# Patient Record
Sex: MALE | Race: WHITE | NOT HISPANIC OR LATINO
[De-identification: names, ages, dates, MRNs, and addresses within clinical notes are randomized per-mention and may not be internally consistent; named-entity substitution may affect disease eponyms.]

---

## 2023-03-30 ENCOUNTER — APPOINTMENT (OUTPATIENT)
Dept: ORTHOPEDIC SURGERY | Facility: CLINIC | Age: 68
End: 2023-03-30
Payer: MEDICARE

## 2023-03-30 VITALS — BODY MASS INDEX: 29.12 KG/M2 | WEIGHT: 215 LBS | HEIGHT: 72 IN

## 2023-03-30 PROBLEM — Z00.00 ENCOUNTER FOR PREVENTIVE HEALTH EXAMINATION: Status: ACTIVE | Noted: 2023-03-30

## 2023-03-30 PROCEDURE — 20610 DRAIN/INJ JOINT/BURSA W/O US: CPT | Mod: LT

## 2023-03-30 PROCEDURE — 99203 OFFICE O/P NEW LOW 30 MIN: CPT | Mod: 25

## 2023-03-30 RX ORDER — AMLODIPINE BESYLATE AND BENAZEPRIL HYDROCHLORIDE 10; 20 MG/1; MG/1
10-20 CAPSULE ORAL
Refills: 0 | Status: ACTIVE | COMMUNITY

## 2023-03-30 RX ORDER — DICLOFENAC POTASSIUM 50 MG/1
50 POWDER, FOR SOLUTION ORAL
Refills: 0 | Status: ACTIVE | COMMUNITY

## 2023-03-30 NOTE — DISCUSSION/SUMMARY
[de-identified] : Reviewed MRI results with patient detail showing meniscal tear of left knee.  Discussed treatment options detail including rest, ice/heat, range of motion exercise, anti-inflammatory medication, physical therapy, cortisone injection, possible surgical intervention if needed.  Physical therapy prescription given.  Patient agreed to cortisone injection.\par \par Dexamethasone and Lidocaine  \par \par Anesthesia: ethyl chloride sprayed topically. Dexamethasone 10mg/51mL 2 cc.  \par \par Lidocaine 1%: 2 cc.  \par \par   \par \par Medication was injected in the left knee after verbal consent using sterile preparation and technique. The risks, benefits, and alternatives to cortisone injection were explained in full to the patient. Risks outlined include but are not limited to infection, sepsis, bleeding, scarring, skin discoloration, temporary increase in pain, syncopal episode, failure to resolve symptoms, allergic reaction, symptom recurrence, and elevation of blood sugar in diabetics. Patient understood the risks. All questions were answered. After discussion of options, patient requested an injection. Oral informed consent was obtained and sterile prep was done of the injection site. Sterile technique was utilized for the procedure including the preparation of the solutions used for the injection. Patient tolerated the procedure well. Advised to ice the injection site this evening. \par \par  \par Patient will follow-up in 6 to 8 weeks with sports department for further evaluation.  Seen under supervision of Dr. Mallory.

## 2023-03-30 NOTE — HISTORY OF PRESENT ILLNESS
[de-identified] : Patient is a 67-year-old male here for evaluation of left knee injury.  Patient states on 3/23/2023 he was getting up from a sitting position and twisted his left knee.  Patient was immediate pain.  Patient followed up with primary physician, MRI was ordered and patient was told he had a meniscal tear of the left knee.  Patient states over the last couple days he has been feeling slightly better but still has pain when standing for long periods of time, ambulating.  Patient has been taking diclofenac for pain.  Denies numbness/tingling, denies instability, denies buckling.

## 2023-03-30 NOTE — PHYSICAL EXAM
[Left] : left knee [Equivocal] : equivocal Cali [] : mildly antalgic [FreeTextEntry9] : Good range of motion throughout with mild pain to medial joint line [de-identified] : Good strength throughout

## 2023-05-04 ENCOUNTER — APPOINTMENT (OUTPATIENT)
Dept: ORTHOPEDIC SURGERY | Facility: CLINIC | Age: 68
End: 2023-05-04
Payer: MEDICARE

## 2023-05-04 PROCEDURE — 99213 OFFICE O/P EST LOW 20 MIN: CPT

## 2023-05-04 NOTE — HISTORY OF PRESENT ILLNESS
[de-identified] : Patient here for evaluation left knee pain.\par \par doing much better, no pain, had injection last visit\par \par NAD\par Left knee\par No skin breakdown\par no joint line ttp\par neg boogie\par Negative lachman\par Negative varus/valgus instability\par ROM 0-130\par Pain with forced extension and flexion\par NVI\par Compartments soft and NT\par \par Xray reviewed and significant for left knee mild degenerative changes\par \par mri left knee mmt, chondromalacia\par \par plan\par went over findings\par epxlained xray\par will cont cons tx since he is doing well and no pain\par hep, pain contorl\par if pain returns, fu in office

## 2023-07-15 ENCOUNTER — APPOINTMENT (OUTPATIENT)
Dept: ORTHOPEDIC SURGERY | Facility: CLINIC | Age: 68
End: 2023-07-15
Payer: MEDICARE

## 2023-07-15 VITALS — HEIGHT: 72 IN | WEIGHT: 215 LBS | BODY MASS INDEX: 29.12 KG/M2

## 2023-07-15 DIAGNOSIS — M23.304 OTHER MENISCUS DERANGEMENTS, UNSPECIFIED MEDIAL MENISCUS, LEFT KNEE: ICD-10-CM

## 2023-07-15 PROCEDURE — 20610 DRAIN/INJ JOINT/BURSA W/O US: CPT | Mod: LT

## 2023-07-15 PROCEDURE — 73562 X-RAY EXAM OF KNEE 3: CPT | Mod: LT

## 2023-07-15 NOTE — DISCUSSION/SUMMARY
[de-identified] : At this time we discussed different treatment options, he is going on vacation so he would like to try another cortisone injection today. Today under sterile technique the area was cleaned and prepped with alcohol, anesthetized with cold spray and prepped with betadine.  With the patient's consent, I injected 2.5 cc of dexamethasone and 2.5 cc of 1% lidocaine into the lateral joint space of the knee. The patient tolerated this well. The puncture site was cleaned and covered with a Band-Aid.  He is going to rest and ice the knee, anti-inflammatories as needed.  We will see him back in a few weeks for a follow-up with Dr. Baltazar to discuss further treatment options. Patient will call me if any other problems or concerns.  Patient verbalized understanding and agreed with the plan, all questions were answered in the office today.\par

## 2023-07-15 NOTE — HISTORY OF PRESENT ILLNESS
[de-identified] : 67-year-old male is here today for for pain in his left knee.  He was seen back in March and had a cortisone injection and was sent for an MRI.  He followed up in May with Dr. Baltazar, at that point he was no longer having any pain.  His MRI showed medial meniscus that was largely absent consistent with a diffuse tear with a parameniscal cyst.  At that point he was advised for no further treatment since she was not having any more pain.  He recently started developing pain again over the last week or so.  No new injury or trauma.

## 2023-07-15 NOTE — IMAGING
[de-identified] : On examination of his left knee he has mild swelling, no erythema, no ecchymosis.  No tenderness to the patella facets, negative patellar grind.  He is able to straight leg raise.  He has good range of motion of the knee.  Negative varus valgus stress test, negative anterior drawer.  He is mildly tender over the medial and lateral joint line.  Calf is soft and nontender.\par \par X-rays repeated in the office today show degenerative changes with joint space narrowing of the medial and patellofemoral compartments.  No fractures or other bony abnormalities noted.

## 2023-07-25 RX ORDER — NABUMETONE 500 MG/1
500 TABLET, FILM COATED ORAL
Qty: 60 | Refills: 0 | Status: ACTIVE | COMMUNITY
Start: 2023-07-25 | End: 1900-01-01

## 2023-08-04 ENCOUNTER — APPOINTMENT (OUTPATIENT)
Dept: ORTHOPEDIC SURGERY | Facility: CLINIC | Age: 68
End: 2023-08-04
Payer: MEDICARE

## 2023-08-04 PROCEDURE — 99214 OFFICE O/P EST MOD 30 MIN: CPT

## 2023-08-04 NOTE — HISTORY OF PRESENT ILLNESS
[de-identified] : Patient here for evaluation left knee pain.  Pain has returned and has had injections  NAD Left knee No skin breakdown no joint line ttp neg boogie Negative lachman Negative varus/valgus instability ROM 0-130 Pain with forced extension and flexion NVI Compartments soft and NT  Xray reviewed and significant for left knee mild degenerative changes  mri left knee mmt, chondromalacia  plan went over findings explained the mri op vs nonop explained left knee arthroscopy, medial meniscectomy  Operative and nonoperative options discussed with patient. Surgical risks, benefits, and alternatives explained. Surgical risks include but are not exclusive to bleeding, infection, neurovascular damage, continued pain, stiffness, scarring, rsd, dvt/pe, potential failure of surgery that may require further surgery in the future. I went over incisions and rehabilitation. All questions answered.

## 2023-08-07 RX ORDER — TRAMADOL HYDROCHLORIDE 50 MG/1
50 TABLET, COATED ORAL
Qty: 60 | Refills: 0 | Status: ACTIVE | COMMUNITY
Start: 2023-08-07 | End: 1900-01-01

## 2023-08-16 ENCOUNTER — INPATIENT (INPATIENT)
Facility: HOSPITAL | Age: 68
LOS: 0 days | Discharge: HOME | DRG: 310 | End: 2023-08-17
Attending: HOSPITALIST | Admitting: INTERNAL MEDICINE
Payer: MEDICARE

## 2023-08-16 VITALS
HEART RATE: 130 BPM | WEIGHT: 212.97 LBS | OXYGEN SATURATION: 100 % | RESPIRATION RATE: 18 BRPM | TEMPERATURE: 98 F | DIASTOLIC BLOOD PRESSURE: 99 MMHG | SYSTOLIC BLOOD PRESSURE: 173 MMHG

## 2023-08-16 DIAGNOSIS — Z98.890 OTHER SPECIFIED POSTPROCEDURAL STATES: Chronic | ICD-10-CM

## 2023-08-16 DIAGNOSIS — I48.91 UNSPECIFIED ATRIAL FIBRILLATION: ICD-10-CM

## 2023-08-16 LAB
ALBUMIN SERPL ELPH-MCNC: 5 G/DL — SIGNIFICANT CHANGE UP (ref 3.5–5.2)
ALP SERPL-CCNC: 50 U/L — SIGNIFICANT CHANGE UP (ref 30–115)
ALT FLD-CCNC: 27 U/L — SIGNIFICANT CHANGE UP (ref 0–41)
ANION GAP SERPL CALC-SCNC: 14 MMOL/L — SIGNIFICANT CHANGE UP (ref 7–14)
APTT BLD: 31.1 SEC — SIGNIFICANT CHANGE UP (ref 27–39.2)
AST SERPL-CCNC: 18 U/L — SIGNIFICANT CHANGE UP (ref 0–41)
BASOPHILS # BLD AUTO: 0.06 K/UL — SIGNIFICANT CHANGE UP (ref 0–0.2)
BASOPHILS NFR BLD AUTO: 0.6 % — SIGNIFICANT CHANGE UP (ref 0–1)
BILIRUB SERPL-MCNC: 1.6 MG/DL — HIGH (ref 0.2–1.2)
BUN SERPL-MCNC: 20 MG/DL — SIGNIFICANT CHANGE UP (ref 10–20)
CALCIUM SERPL-MCNC: 10.3 MG/DL — SIGNIFICANT CHANGE UP (ref 8.4–10.4)
CHLORIDE SERPL-SCNC: 95 MMOL/L — LOW (ref 98–110)
CO2 SERPL-SCNC: 32 MMOL/L — SIGNIFICANT CHANGE UP (ref 17–32)
CREAT SERPL-MCNC: 0.9 MG/DL — SIGNIFICANT CHANGE UP (ref 0.7–1.5)
EGFR: 93 ML/MIN/1.73M2 — SIGNIFICANT CHANGE UP
EOSINOPHIL # BLD AUTO: 0.1 K/UL — SIGNIFICANT CHANGE UP (ref 0–0.7)
EOSINOPHIL NFR BLD AUTO: 1 % — SIGNIFICANT CHANGE UP (ref 0–8)
GLUCOSE SERPL-MCNC: 133 MG/DL — HIGH (ref 70–99)
HCT VFR BLD CALC: 49.4 % — SIGNIFICANT CHANGE UP (ref 42–52)
HGB BLD-MCNC: 17.7 G/DL — SIGNIFICANT CHANGE UP (ref 14–18)
IMM GRANULOCYTES NFR BLD AUTO: 0.4 % — HIGH (ref 0.1–0.3)
INR BLD: 1.25 RATIO — SIGNIFICANT CHANGE UP (ref 0.65–1.3)
LYMPHOCYTES # BLD AUTO: 1.33 K/UL — SIGNIFICANT CHANGE UP (ref 1.2–3.4)
LYMPHOCYTES # BLD AUTO: 12.7 % — LOW (ref 20.5–51.1)
MCHC RBC-ENTMCNC: 30 PG — SIGNIFICANT CHANGE UP (ref 27–31)
MCHC RBC-ENTMCNC: 35.8 G/DL — SIGNIFICANT CHANGE UP (ref 32–37)
MCV RBC AUTO: 83.7 FL — SIGNIFICANT CHANGE UP (ref 80–94)
MONOCYTES # BLD AUTO: 0.77 K/UL — HIGH (ref 0.1–0.6)
MONOCYTES NFR BLD AUTO: 7.4 % — SIGNIFICANT CHANGE UP (ref 1.7–9.3)
NEUTROPHILS # BLD AUTO: 8.17 K/UL — HIGH (ref 1.4–6.5)
NEUTROPHILS NFR BLD AUTO: 77.9 % — HIGH (ref 42.2–75.2)
NRBC # BLD: 0 /100 WBCS — SIGNIFICANT CHANGE UP (ref 0–0)
NT-PROBNP SERPL-SCNC: 29 PG/ML — SIGNIFICANT CHANGE UP (ref 0–300)
PLATELET # BLD AUTO: 364 K/UL — SIGNIFICANT CHANGE UP (ref 130–400)
PMV BLD: 9.1 FL — SIGNIFICANT CHANGE UP (ref 7.4–10.4)
POTASSIUM SERPL-MCNC: 3.7 MMOL/L — SIGNIFICANT CHANGE UP (ref 3.5–5)
POTASSIUM SERPL-SCNC: 3.7 MMOL/L — SIGNIFICANT CHANGE UP (ref 3.5–5)
PROT SERPL-MCNC: 7.6 G/DL — SIGNIFICANT CHANGE UP (ref 6–8)
PROTHROM AB SERPL-ACNC: 14.4 SEC — HIGH (ref 9.95–12.87)
RBC # BLD: 5.9 M/UL — SIGNIFICANT CHANGE UP (ref 4.7–6.1)
RBC # FLD: 12.8 % — SIGNIFICANT CHANGE UP (ref 11.5–14.5)
SODIUM SERPL-SCNC: 141 MMOL/L — SIGNIFICANT CHANGE UP (ref 135–146)
TROPONIN T SERPL-MCNC: <0.01 NG/ML — SIGNIFICANT CHANGE UP
WBC # BLD: 10.47 K/UL — SIGNIFICANT CHANGE UP (ref 4.8–10.8)
WBC # FLD AUTO: 10.47 K/UL — SIGNIFICANT CHANGE UP (ref 4.8–10.8)

## 2023-08-16 PROCEDURE — 83036 HEMOGLOBIN GLYCOSYLATED A1C: CPT

## 2023-08-16 PROCEDURE — 93312 ECHO TRANSESOPHAGEAL: CPT

## 2023-08-16 PROCEDURE — 86803 HEPATITIS C AB TEST: CPT

## 2023-08-16 PROCEDURE — 92960 CARDIOVERSION ELECTRIC EXT: CPT

## 2023-08-16 PROCEDURE — 99221 1ST HOSP IP/OBS SF/LOW 40: CPT

## 2023-08-16 PROCEDURE — 93320 DOPPLER ECHO COMPLETE: CPT

## 2023-08-16 PROCEDURE — 93010 ELECTROCARDIOGRAM REPORT: CPT

## 2023-08-16 PROCEDURE — 71045 X-RAY EXAM CHEST 1 VIEW: CPT | Mod: 26

## 2023-08-16 PROCEDURE — 99285 EMERGENCY DEPT VISIT HI MDM: CPT | Mod: FS

## 2023-08-16 PROCEDURE — 80053 COMPREHEN METABOLIC PANEL: CPT

## 2023-08-16 PROCEDURE — 85025 COMPLETE CBC W/AUTO DIFF WBC: CPT

## 2023-08-16 PROCEDURE — 83735 ASSAY OF MAGNESIUM: CPT

## 2023-08-16 PROCEDURE — 84443 ASSAY THYROID STIM HORMONE: CPT

## 2023-08-16 PROCEDURE — 93325 DOPPLER ECHO COLOR FLOW MAPG: CPT

## 2023-08-16 PROCEDURE — 36415 COLL VENOUS BLD VENIPUNCTURE: CPT

## 2023-08-16 RX ORDER — LANOLIN ALCOHOL/MO/W.PET/CERES
3 CREAM (GRAM) TOPICAL AT BEDTIME
Refills: 0 | Status: DISCONTINUED | OUTPATIENT
Start: 2023-08-16 | End: 2023-08-17

## 2023-08-16 RX ORDER — AMLODIPINE BESYLATE 2.5 MG/1
10 TABLET ORAL DAILY
Refills: 0 | Status: DISCONTINUED | OUTPATIENT
Start: 2023-08-16 | End: 2023-08-17

## 2023-08-16 RX ORDER — RIVAROXABAN 15 MG-20MG
20 KIT ORAL
Refills: 0 | Status: DISCONTINUED | OUTPATIENT
Start: 2023-08-16 | End: 2023-08-17

## 2023-08-16 RX ORDER — ACETAMINOPHEN 500 MG
650 TABLET ORAL EVERY 6 HOURS
Refills: 0 | Status: DISCONTINUED | OUTPATIENT
Start: 2023-08-16 | End: 2023-08-16

## 2023-08-16 RX ORDER — ONDANSETRON 8 MG/1
4 TABLET, FILM COATED ORAL EVERY 8 HOURS
Refills: 0 | Status: DISCONTINUED | OUTPATIENT
Start: 2023-08-16 | End: 2023-08-16

## 2023-08-16 RX ORDER — DILTIAZEM HCL 120 MG
180 CAPSULE, EXT RELEASE 24 HR ORAL DAILY
Refills: 0 | Status: DISCONTINUED | OUTPATIENT
Start: 2023-08-16 | End: 2023-08-17

## 2023-08-16 RX ORDER — SODIUM CHLORIDE 9 MG/ML
1000 INJECTION INTRAMUSCULAR; INTRAVENOUS; SUBCUTANEOUS ONCE
Refills: 0 | Status: COMPLETED | OUTPATIENT
Start: 2023-08-16 | End: 2023-08-16

## 2023-08-16 RX ORDER — METOPROLOL TARTRATE 50 MG
12.5 TABLET ORAL EVERY 12 HOURS
Refills: 0 | Status: DISCONTINUED | OUTPATIENT
Start: 2023-08-16 | End: 2023-08-16

## 2023-08-16 RX ORDER — LANOLIN ALCOHOL/MO/W.PET/CERES
5 CREAM (GRAM) TOPICAL AT BEDTIME
Refills: 0 | Status: DISCONTINUED | OUTPATIENT
Start: 2023-08-16 | End: 2023-08-17

## 2023-08-16 RX ADMIN — AMLODIPINE BESYLATE 10 MILLIGRAM(S): 2.5 TABLET ORAL at 18:25

## 2023-08-16 RX ADMIN — RIVAROXABAN 20 MILLIGRAM(S): KIT at 15:03

## 2023-08-16 RX ADMIN — Medication 180 MILLIGRAM(S): at 18:25

## 2023-08-16 RX ADMIN — Medication 5 MILLIGRAM(S): at 22:45

## 2023-08-16 RX ADMIN — SODIUM CHLORIDE 1000 MILLILITER(S): 9 INJECTION INTRAMUSCULAR; INTRAVENOUS; SUBCUTANEOUS at 13:40

## 2023-08-16 NOTE — ED PROVIDER NOTE - CLINICAL SUMMARY MEDICAL DECISION MAKING FREE TEXT BOX
67y history of  HTN sent to ED due to new onset a-fib rvr. pt  have PST for knee replacement today and was found to be in Afib, pt states last time approxmalyte 2-3 days ago had a normal ecg.. Pt endorses intermittent episodes of painless palpitations over the past couple days. No inciting or relieving factors. Denies fever, ha, cp, sob, weakness, numbness, n/v  phyical exam other than irregular tachycardia was unremarkable  labs wnl.  pt will be admitted under telemetric bed

## 2023-08-16 NOTE — CONSULT NOTE ADULT - SUBJECTIVE AND OBJECTIVE BOX
Date of Admission: 8/16/23    CHIEF COMPLAINT: found in afib while about to go for knee scope    HISTORY OF PRESENT ILLNESS: 67yMale with PMH below presented to the hospital for above CC. PMHx of borderline HTN, torn L meniscus. Likely has sleep apnea by history of snoring, night awakenings. This is his likely cause.   Denies symptoms of palpitations or shortness of breath so unknown duration of onset, although had EKG a fwew days ago which was WNL per patient in Dr. Fischer's office.     PAST MEDICAL & SURGICAL HISTORY:      FAMILY HISTORY:  [x ] no pertinent family history of premature cardiovascular disease in first degree relatives.  Mother:   Father:   Siblings:     SOCIAL HISTORY:    [ ] Non-smoker  [ ] Smoker  [ ] Alcohol    Allergies    No Known Allergies    Intolerances    	    REVIEW OF SYSTEMS:  CONSTITUTIONAL: No fever, weight loss, or fatigue  CARDIOLOGY: PAtient denies chest pain, shortness of breath or syncopal episodes.   RESPIRATORY: denies shortness of breath, wheezeing.   NEUROLOGICAL: NO weakness, no focal deficits to report.  ENDOCRINOLOGICAL: no recent change in diabetic medications.   GI: no BRBPR, no N,V,diarrhea.    PSYCHIATRY: normal mood and affect  HEENT: no nasal discharge, no ecchymosis  SKIN: no ecchymosis, no breakdown  MUSCULOSKELETAL: Full range of motion x4.     PHYSICAL EXAM:  T(C): 36.8 (08-16-23 @ 12:57), Max: 36.8 (08-16-23 @ 12:57)  HR: 130 (08-16-23 @ 12:57) (130 - 130)  BP: 173/99 (08-16-23 @ 12:57) (173/99 - 173/99)  RR: 18 (08-16-23 @ 12:57) (18 - 18)  SpO2: 100% (08-16-23 @ 12:57) (100% - 100%)  Wt(kg): --  I&O's Summary      General Appearance: well appearing, normal for age and gender. 	  Neck: normal JVP, no bruit.   Eyes: No xanthomalasia, Extra Ocular muscles intact.   Cardiovascular: irregular rate and rhythm S1 S2, No JVD, No murmurs, No edema  Respiratory: Lungs clear to auscultation	  Psychiatry: Alert and oriented x 3, Mood & affect appropriate  Gastrointestinal:  Soft, Non-tender  Skin/Integumen: No rashes, No ecchymoses, No cyanosis	  Neurologic: Non-focal  Musculoskeletal/ extremities: Normal range of motion, No clubbing, cyanosis or edema  Vascular: Peripheral pulses palpable 2+ bilaterally    LABS:	 	                          17.7   10.47 )-----------( 364      ( 16 Aug 2023 13:28 )             49.4     08-16    141  |  95<L>  |  20  ----------------------------<  133<H>  3.7   |  32  |  0.9    Ca    10.3      16 Aug 2023 13:28    TPro  7.6  /  Alb  5.0  /  TBili  1.6<H>  /  DBili  x   /  AST  18  /  ALT  27  /  AlkPhos  50  08-16    CARDIAC MARKERS ( 16 Aug 2023 13:28 )  x     / <0.01 ng/mL / x     / x     / x          PT/INR - ( 16 Aug 2023 13:28 )   PT: 14.40 sec;   INR: 1.25 ratio         PTT - ( 16 Aug 2023 13:28 )  PTT:31.1 sec    CARDIAC MARKERS:            TELEMETRY EVENTS: 	afib    ECG:  Afib	  RADIOLOGY:  OTHER: 	    PREVIOUS DIAGNOSTIC TESTING:    [ ] Echocardiogram:  [ ]  Catheterization:  [ ] Stress Test:  	  	    Home Medications:    MEDICATIONS  (STANDING):    MEDICATIONS  (PRN):

## 2023-08-16 NOTE — ED PROVIDER NOTE - ATTENDING APP SHARED VISIT CONTRIBUTION OF CARE
I have personally performed a history and physical exam on this patient and personally directed the management of the patient.  67y history of  HTN sent to ED due to new onset a-fib rvr. pt  have PST for knee replacement today and was found to be in Afib, pt states last time approxmalyte 2-3 days ago had a normal ecg.. Pt endorses intermittent episodes of painless palpitations over the past couple days. No inciting or relieving factors. Denies fever, ha, cp, sob, weakness, numbness, n/v  CON: appears stated age, pleasant, no acute distress, HENMT: normocephalic, atraumatic, anicteric, no conjunctival injection,  CV: irrigular rhythm, tachycardic to 120s distal pulses intact, RESP: no acute respiratory distress, no stridor, breathing comfortably on RA , GI:  soft, nontender, no rebound, no guarding, SKIN: no wounds MSK: no deformities, NEURO: no gross motor or sensory deficit Psychiatric: appropriate mood, appropriate affect  will send labs, cardiology consult and likely admit

## 2023-08-16 NOTE — PACU DISCHARGE NOTE - THE ANESTHESIA ORDERS USED IN THE PACU ORDER SET WILL BE DISCONTINUED UPON TRANSFER OF THIS PATIENT
Statement Selected Lazy S Intermediate Repair Preamble Text (Leave Blank If You Do Not Want): Undermining was performed with blunt dissection.

## 2023-08-16 NOTE — H&P ADULT - NSHPPHYSICALEXAM_GEN_ALL_CORE
LOS:     VITALS:   T(C): 36.8 (08-16-23 @ 15:16), Max: 36.8 (08-16-23 @ 12:57)  HR: 130 (08-16-23 @ 15:16) (130 - 130)  BP: 173/99 (08-16-23 @ 15:16) (173/99 - 173/99)  RR: 18 (08-16-23 @ 15:16) (18 - 18)  SpO2: 100% (08-16-23 @ 15:16) (100% - 100%)    GENERAL: NAD, lying in bed comfortably  HEAD:  Atraumatic, Normocephalic  EYES: EOMI, PERRLA, conjunctiva and sclera clear  ENT: Moist mucous membranes  NECK: Supple, No JVD  CHEST/LUNG: Clear to auscultation bilaterally; No rales, rhonchi, wheezing, or rubs. Unlabored respirations  HEART: Regular rate and rhythm; No murmurs, rubs, or gallops  ABDOMEN: BSx4; Soft, nontender, nondistended  EXTREMITIES:  2+ Peripheral Pulses, brisk capillary refill. No clubbing, cyanosis, or edema  NERVOUS SYSTEM:  A&Ox3, no focal deficits   SKIN: No rashes or lesions LOS:     VITALS:   T(C): 36.8 (08-16-23 @ 15:16), Max: 36.8 (08-16-23 @ 12:57)  HR: 130 (08-16-23 @ 15:16) (130 - 130)  BP: 173/99 (08-16-23 @ 15:16) (173/99 - 173/99)  RR: 18 (08-16-23 @ 15:16) (18 - 18)  SpO2: 100% (08-16-23 @ 15:16) (100% - 100%)    GENERAL: NAD, lying in bed comfortably  HEAD:  NCAT  EYES: Conjunctiva and sclera clear  ENT: Moist mucous membranes  NECK: Supple, No JVD  CHEST/LUNG: + Bibasilar crackles; Unlabored respirations  HEART: RRR  ABDOMEN: BS present; + Mildly distended; Soft, nontender  EXTREMITIES: No clubbing, cyanosis, or edema  NERVOUS SYSTEM:  A&Ox4, no focal deficits

## 2023-08-16 NOTE — H&P ADULT - HISTORY OF PRESENT ILLNESS
66 y/o M w/ PMHx of HTN presented to the ED for AFib RVR found incidentally on EKG done for pre-testing prior to L Knee meniscal tear repair. Unknown onset, though patient states he had an EKG a few days, which was WNL per patient in Dr. Fischer's office.  68 y/o M w/ PMHx of HTN presented to the ED for AFib RVR found incidentally on EKG done for pre-testing prior to L Knee meniscal tear repair. Unknown onset, though patient states he had an EKG a few days, which was WNL per patient in Dr. Fischer's office. No cardiology w/u in the past. Denies: CP, SOB, dizziness/lightheadedness, hx of prior sxs.     In the ED, VS significant for elevated BP w/ BP of 173/99, tachycardic w/ HR of 130; other VS unremarkable. EKG showed new onset AFib w/ RVR. Labs significant for Tbili 1.6, otherwise unremarkable. S/p 1L bolus in the ED. Cardiology consulted & recommended SULEIMAN/dCVV, which was performed successfully on 8/16/23. Recommended admission to telemetry for monitoring for new onset AFib w/ RVR s/p successful dCVV

## 2023-08-16 NOTE — H&P ADULT - NSICDXPASTSURGICALHX_GEN_ALL_CORE_FT
PAST SURGICAL HISTORY:  History of excision of pilonidal cyst     History of surgical removal of testicle

## 2023-08-16 NOTE — ED ADULT TRIAGE NOTE - CHIEF COMPLAINT QUOTE
Patient states he was sent to ED from presurgical testing due to afib with RVR. Patient states he was scheduled for knee surgery today,

## 2023-08-16 NOTE — H&P ADULT - ASSESSMENT
66 y/o M w/ PMHx of HTN presented to the ED for AFib RVR found incidentally on EKG done for pre-testing prior to L Knee meniscal tear repair. Unknown onset, though patient states he had an EKG a few days, which was WNL per patient in Dr. Fischer's office.      66 y/o M w/ PMHx of HTN presented to the ED for AFib RVR. S/p sucessful SULEIMAN/dCVV. Admitted to tele for monitoring    #New onset AFib  - S/p SULEIMAN/dCVV on 8/16  - CHADSVASC 2  - Cardio on board:  Rate control w/ cardizem  mg po q24h  Xarelto 20 mg po q24h for at least one month, uninterrupted after DCCV  Patient understands he will have to delay meniscus surgery for one month after DCCV.   - F/u TSH, A1c  - F/u SULEIMAN results  - OP sleep study for likely sleep apnea; endorses snoring    #HTN  - /99 on admission  - C/w home amlodipine 10 mg daily  - C/w home chlorthalidone 25 mg daily    #MISC  - DVT ppx: Xarelto  - GI ppx: NI  - Diet: DASH  - Activity: AAT  - Code status: Full Code  - Dispo: Tele; anticipate d/c in less than 24 hours

## 2023-08-16 NOTE — ED ADULT NURSE NOTE - OBJECTIVE STATEMENT
Pt. states he was sent to ED from pre-surgery testing. As per pt.. pt. was supposed to go for knee surgery, but was sent her to due to A-fib with RVR. Pt. denies chest pain/SOB. Pt. denies N/V/D.

## 2023-08-16 NOTE — CHART NOTE - NSCHARTNOTEFT_GEN_A_CORE
POST OPERATIVE PROCEDURAL DOCUMENTATION  PRE-OP DIAGNOSIS: A fib    POST-OP DIAGNOSIS: Sinus rhythm with PAC    PROCEDURE: Transesophageal Echocardiogram and DCCV    Primary Physician: Dr. Rahman   Cardiology Fellow: Dr. Norton    ANESTHESIA TYPE  [  ] General Anesthesia  [ x ] Conscious Sedation  [  ] Local/Regional    CONDITION  [  ] Critical  [  ] Serious  [  ] Fair  [ x ] Good    SPECIMENS REMOVED (IF APPLICABLE): N/A    IMPLANTS (IF APPLICABLE): None    ESTIMATED BLOOD LOSS: None    COMPLICATIONS: None    After risks and benefits of procedures were explained, informed consent was obtained and placed in chart.   The patient received topical anesthetic to the oropharynx with viscous lidocaine and benzocaine spray.  Refer to Anesthesia note for sedation details.  The SULEIMAN probe was passed into the esophagus without difficulty.  Transesophageal and transgastric images were obtained.  The SULEIMAN probe was removed without difficulty and examined.  There was no evidence for bleeding.  The patient tolerated the procedure well without any immediate SULEIMAN-related complications.      Preliminary Findings:  LA: Mildly enlarged  DONNA: Left atrial appendage was clear of clot and smoke. Normal doppler velocities   LV: LVEF was estimated at 55-65%  MV: Mild MR  AV: No AI, no  AS  RA: Mildly enlarged  RV: Normal size and function  TV: Trivial TR  PV: No AL, no PS  IAS: No PFO or ASD. No R-> L shunt   Aorta: There was mild, non-mobile atheroma seen in the thoracic aorta.    Patient successfully converted to sinus rhythm with 200 J of synchronized direct current cardioversion (DCCV) via AP pads.    DIAGNOSIS/IMPRESSION: s/p DCCV (200 J) now in sinus rhythm with PAC    Full report to follow    PLAN OF CARE:  [x] Return to inpatient bed when stable and fully awake.  [x] Continue xarelto  [x] No eating or drinking for 1 hour  [X] Start Cardizem 180 mg PO QD  [x] No driving for 24 hours    Results of procedure/ plan of care discussed with patient/  in detail.

## 2023-08-16 NOTE — CONSULT NOTE ADULT - ASSESSMENT
New onset Atrial Fibrillation  - rate control with cardizem  mg po q24h  - CHADSVASC 2  - Xarelto 20 mg po q24h for at least one month, uninterrupted after DCCV  - keep NPO, will perform SULEIMAN/DCCV today  - check tsh  - evaluation for obstructive sleep apnea as outpatient  - anticipate discharge tomorrow.   - Patient understand he will have to delay meniscus surgery for one month after DCCV.

## 2023-08-16 NOTE — ED ADULT NURSE REASSESSMENT NOTE - NS ED NURSE REASSESS COMMENT FT1
Report given by CATH RN. As per RN, pt. received 1 shock; 200 joules. No clots. 5mg Metoprolol IV push administered in CATH. 20mg Xarelto PO administered in CATH. Pt. remains in CATH currently.

## 2023-08-16 NOTE — H&P ADULT - ATTENDING COMMENTS
68 y/o M w/ PMHx of HTN presented to the ED for AFib RVR. S/p sucessful SULEIMAN/dCVV. Admitted to tele for monitoring    #Paroxysmal AFib  S/p SULEIMAN/dCVV on 8/16  CHADSVAS 2  Cardio on board:  - Cont cardizem  mg po q24h  - Cont Xarelto 20 mg po q24h for at least one month, uninterrupted after DCCV  - F/u TSH, A1c  - OP sleep study for likely sleep apnea; endorses snoring  - DC planning tomorrow if stable    #HTN  - /99 on admission  - C/w home amlodipine 10 mg daily  - C/w home chlorthalidone 25 mg daily    DVT PPX, xarelto    #Progress Note Handoff  Pending (specify): Tele monitoring  Family discussion: annie pt regarding tx for afib  Disposition: Home

## 2023-08-16 NOTE — ED ADULT NURSE NOTE - NSFALLUNIVINTERV_ED_ALL_ED
Bed/Stretcher in lowest position, wheels locked, appropriate side rails in place/Call bell, personal items and telephone in reach/Instruct patient to call for assistance before getting out of bed/chair/stretcher/Non-slip footwear applied when patient is off stretcher/[place] to call system/Physically safe environment - no spills, clutter or unnecessary equipment/Purposeful proactive rounding/Room/bathroom lighting operational, light cord in reach

## 2023-08-16 NOTE — ED PROVIDER NOTE - OBJECTIVE STATEMENT
67y M pmh HTN presents for eval. Pt went to have PST for knee replacement today and was found to be in Afib. Pt endorses intermittent episodes of painless palpitations over the past couple days. No inciting or relieving factors. Denies fever, ha, cp, sob, weakness, numbness, n/v

## 2023-08-16 NOTE — ED PROVIDER NOTE - PHYSICAL EXAMINATION
CONST: Well appearing in NAD  CARD: Irregular tachycardiac S1 S2; No jvd  RESP: Equal BS B/L, No wheezes, rhonchi or rales. No distress  MS: Normal ROM in all extremities. pulses 2 +. no calf tenderness or swelling  SKIN: Warm, dry, no acute rashes. Good turgor  NEURO: A&Ox3, No focal deficits. Strength 5/5 with no sensory deficits.

## 2023-08-17 ENCOUNTER — TRANSCRIPTION ENCOUNTER (OUTPATIENT)
Age: 68
End: 2023-08-17

## 2023-08-17 VITALS
DIASTOLIC BLOOD PRESSURE: 79 MMHG | TEMPERATURE: 98 F | OXYGEN SATURATION: 99 % | HEART RATE: 62 BPM | RESPIRATION RATE: 18 BRPM | SYSTOLIC BLOOD PRESSURE: 158 MMHG

## 2023-08-17 DIAGNOSIS — Z79.899 OTHER LONG TERM (CURRENT) DRUG THERAPY: ICD-10-CM

## 2023-08-17 DIAGNOSIS — I48.91 UNSPECIFIED ATRIAL FIBRILLATION: ICD-10-CM

## 2023-08-17 DIAGNOSIS — I10 ESSENTIAL (PRIMARY) HYPERTENSION: ICD-10-CM

## 2023-08-17 LAB
A1C WITH ESTIMATED AVERAGE GLUCOSE RESULT: 6.4 % — HIGH (ref 4–5.6)
ALBUMIN SERPL ELPH-MCNC: 4.4 G/DL — SIGNIFICANT CHANGE UP (ref 3.5–5.2)
ALP SERPL-CCNC: 42 U/L — SIGNIFICANT CHANGE UP (ref 30–115)
ALT FLD-CCNC: 21 U/L — SIGNIFICANT CHANGE UP (ref 0–41)
ANION GAP SERPL CALC-SCNC: 12 MMOL/L — SIGNIFICANT CHANGE UP (ref 7–14)
AST SERPL-CCNC: 15 U/L — SIGNIFICANT CHANGE UP (ref 0–41)
BASOPHILS # BLD AUTO: 0.07 K/UL — SIGNIFICANT CHANGE UP (ref 0–0.2)
BASOPHILS NFR BLD AUTO: 0.7 % — SIGNIFICANT CHANGE UP (ref 0–1)
BILIRUB SERPL-MCNC: 1.4 MG/DL — HIGH (ref 0.2–1.2)
BUN SERPL-MCNC: 19 MG/DL — SIGNIFICANT CHANGE UP (ref 10–20)
CALCIUM SERPL-MCNC: 9.7 MG/DL — SIGNIFICANT CHANGE UP (ref 8.4–10.4)
CHLORIDE SERPL-SCNC: 98 MMOL/L — SIGNIFICANT CHANGE UP (ref 98–110)
CO2 SERPL-SCNC: 30 MMOL/L — SIGNIFICANT CHANGE UP (ref 17–32)
CREAT SERPL-MCNC: 0.9 MG/DL — SIGNIFICANT CHANGE UP (ref 0.7–1.5)
EGFR: 93 ML/MIN/1.73M2 — SIGNIFICANT CHANGE UP
EOSINOPHIL # BLD AUTO: 0.26 K/UL — SIGNIFICANT CHANGE UP (ref 0–0.7)
EOSINOPHIL NFR BLD AUTO: 2.7 % — SIGNIFICANT CHANGE UP (ref 0–8)
ESTIMATED AVERAGE GLUCOSE: 137 MG/DL — HIGH (ref 68–114)
GLUCOSE SERPL-MCNC: 118 MG/DL — HIGH (ref 70–99)
HCT VFR BLD CALC: 44.4 % — SIGNIFICANT CHANGE UP (ref 42–52)
HCV AB S/CO SERPL IA: 0.04 COI — SIGNIFICANT CHANGE UP
HCV AB SERPL-IMP: SIGNIFICANT CHANGE UP
HGB BLD-MCNC: 15.7 G/DL — SIGNIFICANT CHANGE UP (ref 14–18)
IMM GRANULOCYTES NFR BLD AUTO: 0.3 % — SIGNIFICANT CHANGE UP (ref 0.1–0.3)
LYMPHOCYTES # BLD AUTO: 2 K/UL — SIGNIFICANT CHANGE UP (ref 1.2–3.4)
LYMPHOCYTES # BLD AUTO: 20.6 % — SIGNIFICANT CHANGE UP (ref 20.5–51.1)
MAGNESIUM SERPL-MCNC: 2.2 MG/DL — SIGNIFICANT CHANGE UP (ref 1.8–2.4)
MCHC RBC-ENTMCNC: 30.3 PG — SIGNIFICANT CHANGE UP (ref 27–31)
MCHC RBC-ENTMCNC: 35.4 G/DL — SIGNIFICANT CHANGE UP (ref 32–37)
MCV RBC AUTO: 85.5 FL — SIGNIFICANT CHANGE UP (ref 80–94)
MONOCYTES # BLD AUTO: 0.75 K/UL — HIGH (ref 0.1–0.6)
MONOCYTES NFR BLD AUTO: 7.7 % — SIGNIFICANT CHANGE UP (ref 1.7–9.3)
NEUTROPHILS # BLD AUTO: 6.61 K/UL — HIGH (ref 1.4–6.5)
NEUTROPHILS NFR BLD AUTO: 68 % — SIGNIFICANT CHANGE UP (ref 42.2–75.2)
NRBC # BLD: 0 /100 WBCS — SIGNIFICANT CHANGE UP (ref 0–0)
PLATELET # BLD AUTO: 316 K/UL — SIGNIFICANT CHANGE UP (ref 130–400)
PMV BLD: 9.2 FL — SIGNIFICANT CHANGE UP (ref 7.4–10.4)
POTASSIUM SERPL-MCNC: 4 MMOL/L — SIGNIFICANT CHANGE UP (ref 3.5–5)
POTASSIUM SERPL-SCNC: 4 MMOL/L — SIGNIFICANT CHANGE UP (ref 3.5–5)
PROT SERPL-MCNC: 6.5 G/DL — SIGNIFICANT CHANGE UP (ref 6–8)
RBC # BLD: 5.19 M/UL — SIGNIFICANT CHANGE UP (ref 4.7–6.1)
RBC # FLD: 12.9 % — SIGNIFICANT CHANGE UP (ref 11.5–14.5)
SODIUM SERPL-SCNC: 140 MMOL/L — SIGNIFICANT CHANGE UP (ref 135–146)
TSH SERPL-MCNC: 2.35 UIU/ML — SIGNIFICANT CHANGE UP (ref 0.27–4.2)
WBC # BLD: 9.72 K/UL — SIGNIFICANT CHANGE UP (ref 4.8–10.8)
WBC # FLD AUTO: 9.72 K/UL — SIGNIFICANT CHANGE UP (ref 4.8–10.8)

## 2023-08-17 PROCEDURE — 99239 HOSP IP/OBS DSCHRG MGMT >30: CPT

## 2023-08-17 RX ORDER — DILTIAZEM HCL 120 MG
1 CAPSULE, EXT RELEASE 24 HR ORAL
Qty: 30 | Refills: 0
Start: 2023-08-17 | End: 2023-09-15

## 2023-08-17 RX ORDER — RIVAROXABAN 15 MG-20MG
1 KIT ORAL
Qty: 30 | Refills: 0
Start: 2023-08-17 | End: 2023-09-15

## 2023-08-17 RX ADMIN — AMLODIPINE BESYLATE 10 MILLIGRAM(S): 2.5 TABLET ORAL at 06:34

## 2023-08-17 RX ADMIN — Medication 180 MILLIGRAM(S): at 06:34

## 2023-08-17 NOTE — DISCHARGE NOTE PROVIDER - NSDCFUSCHEDAPPT_GEN_ALL_CORE_FT
Harvey Saha Physician [facility]  [facility] [place]  Scheduled Appointment: 08/23/2023    Harvey Saha  [place] Physician [facility]  [facility] [place]  Scheduled Appointment: 10/20/2023

## 2023-08-17 NOTE — DISCHARGE NOTE PROVIDER - NSDCCPCAREPLAN_GEN_ALL_CORE_FT
PRINCIPAL DISCHARGE DIAGNOSIS  Diagnosis: Afib  Assessment and Plan of Treatment: You presented for new onset atrial fibrillation. You were seen by cardiology and underwent an echo to rule out clots in the heart and electrical cardioversion. Your heart rhythm returned to normal and an anticoagulation was started to reduce the risk of clots. Please continue with the xarelto and follow up with your cardiologist as outpatient. Your surgery will have to be delayed a month. Additionally, it is recommended you follow up with a pulmonary doctor to rule out sleep apnea

## 2023-08-17 NOTE — PROGRESS NOTE ADULT - SUBJECTIVE AND OBJECTIVE BOX
INTERVAL HPI/OVERNIGHT EVENTS:    SUBJECTIVE: Patient seen and examined at bedside.     cc: afib  no cp, sob, abd pain, fever  no cp, palpitations, palomares, orthopnea    OBJECTIVE:    VITAL SIGNS:  Vital Signs Last 24 Hrs  T(C): 36.4 (17 Aug 2023 08:14), Max: 36.8 (16 Aug 2023 15:16)  T(F): 97.6 (17 Aug 2023 08:14), Max: 97.6 (17 Aug 2023 00:04)  HR: 62 (17 Aug 2023 08:14) (58 - 130)  BP: 158/79 (17 Aug 2023 08:14) (134/72 - 173/99)  BP(mean): --  RR: 18 (17 Aug 2023 08:14) (18 - 18)  SpO2: 99% (17 Aug 2023 08:14) (99% - 100%)    Parameters below as of 17 Aug 2023 08:14  Patient On (Oxygen Delivery Method): room air          PHYSICAL EXAM:    General: NAD  HEENT: NC/AT; PERRL, clear conjunctiva  Neck: supple  Respiratory: CTA b/l  Cardiovascular: +S1/S2; RRR  Abdomen: soft, NT/ND; +BS x4  Extremities: WWP, 2+ peripheral pulses b/l; no LE edema  Skin: normal color and turgor; no rash  Neurological:    MEDICATIONS:  MEDICATIONS  (STANDING):  amLODIPine   Tablet 10 milliGRAM(s) Oral daily  diltiazem    milliGRAM(s) Oral daily  rivaroxaban 20 milliGRAM(s) Oral with dinner    MEDICATIONS  (PRN):  melatonin 5 milliGRAM(s) Oral at bedtime PRN Insomnia  melatonin 3 milliGRAM(s) Oral at bedtime PRN Insomnia      ALLERGIES:  Allergies    No Known Allergies    Intolerances        LABS:                        15.7   9.72  )-----------( 316      ( 17 Aug 2023 06:48 )             44.4     Hemoglobin: 15.7 g/dL (08-17 @ 06:48)  Hemoglobin: 17.7 g/dL (08-16 @ 13:28)    CBC Full  -  ( 17 Aug 2023 06:48 )  WBC Count : 9.72 K/uL  RBC Count : 5.19 M/uL  Hemoglobin : 15.7 g/dL  Hematocrit : 44.4 %  Platelet Count - Automated : 316 K/uL  Mean Cell Volume : 85.5 fL  Mean Cell Hemoglobin : 30.3 pg  Mean Cell Hemoglobin Concentration : 35.4 g/dL  Auto Neutrophil # : 6.61 K/uL  Auto Lymphocyte # : 2.00 K/uL  Auto Monocyte # : 0.75 K/uL  Auto Eosinophil # : 0.26 K/uL  Auto Basophil # : 0.07 K/uL  Auto Neutrophil % : 68.0 %  Auto Lymphocyte % : 20.6 %  Auto Monocyte % : 7.7 %  Auto Eosinophil % : 2.7 %  Auto Basophil % : 0.7 %    08-17    140  |  98  |  19  ----------------------------<  118<H>  4.0   |  30  |  0.9    Ca    9.7      17 Aug 2023 06:48  Mg     2.2     08-17    TPro  6.5  /  Alb  4.4  /  TBili  1.4<H>  /  DBili  x   /  AST  15  /  ALT  21  /  AlkPhos  42  08-17    Creatinine Trend: 0.9<--, 0.9<--  LIVER FUNCTIONS - ( 17 Aug 2023 06:48 )  Alb: 4.4 g/dL / Pro: 6.5 g/dL / ALK PHOS: 42 U/L / ALT: 21 U/L / AST: 15 U/L / GGT: x           PT/INR - ( 16 Aug 2023 13:28 )   PT: 14.40 sec;   INR: 1.25 ratio         PTT - ( 16 Aug 2023 13:28 )  PTT:31.1 sec    hs Troponin:            Urinalysis Basic - ( 17 Aug 2023 06:48 )    Color: x / Appearance: x / SG: x / pH: x  Gluc: 118 mg/dL / Ketone: x  / Bili: x / Urobili: x   Blood: x / Protein: x / Nitrite: x   Leuk Esterase: x / RBC: x / WBC x   Sq Epi: x / Non Sq Epi: x / Bacteria: x      CSF:                      EKG:   MICROBIOLOGY:    IMAGING:      Labs, imaging, EKG personally reviewed    RADIOLOGY & ADDITIONAL TESTS: Reviewed.

## 2023-08-17 NOTE — PROGRESS NOTE ADULT - PROBLEM SELECTOR PLAN 1
new onset; incidentally noted on preop ekg  s/p cardioversion 8/16 to nsr  xarelto  dilt 180  stable for d/c, needs outpt pmd, cards f/u one week

## 2023-08-17 NOTE — DISCHARGE NOTE PROVIDER - NSDCMRMEDTOKEN_GEN_ALL_CORE_FT
amLODIPine 10 mg oral tablet: 1 orally once a day  chlorthalidone 25 mg oral tablet: 1 orally once a day  Xarelto 20 mg oral tablet: 1 tab(s) orally once a day   amLODIPine 10 mg oral tablet: 1 orally once a day  chlorthalidone 25 mg oral tablet: 1 orally once a day  dilTIAZem 180 mg/24 hours oral capsule, extended release: 1 cap(s) orally once a day  Xarelto 20 mg oral tablet: 1 tab(s) orally once a day

## 2023-08-17 NOTE — DISCHARGE NOTE PROVIDER - HOSPITAL COURSE
66 y/o M w/ PMHx of HTN presented to the ED for AFib RVR found incidentally on EKG done for pre-testing prior to L Knee meniscal tear repair. Unknown onset, though patient states he had an EKG a few days, which was WNL per patient in Dr. Fischer's office. No cardiology w/u in the past. Denies: CP, SOB, dizziness/lightheadedness, hx of prior sxs.     In the ED, VS significant for elevated BP w/ BP of 173/99, tachycardic w/ HR of 130; other VS unremarkable. EKG showed new onset AFib w/ RVR. Labs significant for Tbili 1.6, otherwise unremarkable. S/p 1L bolus in the ED. Cardiology consulted & recommended SULEIMAN/dCVV, which was performed successfully on 8/16/23. Admitted to telemetry for monitoring for new onset AFib w/ RVR s/p successful dCVV.  Patient remained hemodynamically stable, in sinus rhythm  DC home

## 2023-08-17 NOTE — DISCHARGE NOTE NURSING/CASE MANAGEMENT/SOCIAL WORK - PATIENT PORTAL LINK FT
You can access the FollowMyHealth Patient Portal offered by [facility] by registering at the following website: [email or web address]. By joining [facility]’s FollowMyHealth portal, you will also be able to view your health information using other applications (apps) compatible with our system.

## 2023-08-17 NOTE — DISCHARGE NOTE PROVIDER - CARE PROVIDER_API CALL
Herbert Rahman  Cardiovascular Disease  [address] [place] [address], [place] [address]  Phone: [phone]  Fax: [phone]  Follow Up Time: 1 week   Herbert Rahman  Cardiovascular Disease  [address], [address]  [place] [address]  Phone: [phone]  Fax: [phone]  Follow Up Time: 2 weeks    Jermaine Mora  Pulmonary Disease  [address] [place] [id number]  Phone: [phone]  Fax: [phone]  Follow Up Time: 1 week

## 2023-08-17 NOTE — DISCHARGE NOTE PROVIDER - PROVIDER TOKENS
PROVIDER:[TOKEN:[[id number]],FOLLOWUP:[1 week]] PROVIDER:[TOKEN:[[id number]],FOLLOWUP:[2 weeks]],PROVIDER:[TOKEN:[[id number]],FOLLOWUP:[1 week]]

## 2023-08-23 ENCOUNTER — APPOINTMENT (OUTPATIENT)
Dept: PULMONOLOGY | Facility: CLINIC | Age: 68
End: 2023-08-23
Payer: MEDICARE

## 2023-08-23 DIAGNOSIS — Z86.79 PERSONAL HISTORY OF OTHER DISEASES OF THE CIRCULATORY SYSTEM: ICD-10-CM

## 2023-08-23 PROCEDURE — 99202 OFFICE O/P NEW SF 15 MIN: CPT | Mod: 95

## 2023-08-23 NOTE — REASON FOR VISIT
[Home] : at home, [unfilled] , at the time of the visit. [Medical Office: ([facility])___] : at the medical office located in  [Spouse] : spouse [Patient] : the patient [Self] : self [Initial] : an initial visit [Sleep Evaluation] : sleep evaluation [TextBox_44] : The patient presents for the evaluation of possible sleep apnea.  Patient recently had a meniscal tear and was scheduled to have an orthopedic procedure.  When he presented at the facility he was found to be tachycardic and in atrial fibrillation.  The procedure was canceled and the patient was seen by the cardiologist.  The atrial fibrillation was reversed.  As part of the work-up obstructive sleep apnea is being considered.  The patient has all the signs and symptoms of significant disease.

## 2023-08-23 NOTE — HISTORY OF PRESENT ILLNESS
[TextBox_4] : The patient presents for the evaluation of loud snoring and restlessness at night. The patient is restless during sleep and has frequent nocturia. The bed partner confirms the restlessness. During the day there are episodes on increased sleepiness.

## 2023-08-25 ENCOUNTER — OUTPATIENT (OUTPATIENT)
Dept: OUTPATIENT SERVICES | Facility: HOSPITAL | Age: 68
LOS: 1 days | Discharge: HOME | End: 2023-08-25
Payer: MEDICARE

## 2023-08-25 ENCOUNTER — APPOINTMENT (OUTPATIENT)
Dept: SLEEP CENTER | Facility: HOSPITAL | Age: 68
End: 2023-08-25
Payer: MEDICARE

## 2023-08-25 DIAGNOSIS — G47.33 OBSTRUCTIVE SLEEP APNEA (ADULT) (PEDIATRIC): ICD-10-CM

## 2023-08-25 DIAGNOSIS — Z98.890 OTHER SPECIFIED POSTPROCEDURAL STATES: Chronic | ICD-10-CM

## 2023-08-25 PROCEDURE — 95806 SLEEP STUDY UNATT&RESP EFFT: CPT

## 2023-08-25 PROCEDURE — 95800 SLP STDY UNATTENDED: CPT | Mod: 26

## 2023-08-28 DIAGNOSIS — Z87.891 PERSONAL HISTORY OF NICOTINE DEPENDENCE: ICD-10-CM

## 2023-08-28 DIAGNOSIS — I48.0 PAROXYSMAL ATRIAL FIBRILLATION: ICD-10-CM

## 2023-08-28 DIAGNOSIS — Z79.899 OTHER LONG TERM (CURRENT) DRUG THERAPY: ICD-10-CM

## 2023-08-28 DIAGNOSIS — I10 ESSENTIAL (PRIMARY) HYPERTENSION: ICD-10-CM

## 2023-08-29 DIAGNOSIS — G47.33 OBSTRUCTIVE SLEEP APNEA (ADULT) (PEDIATRIC): ICD-10-CM

## 2023-09-11 ENCOUNTER — RESULT REVIEW (OUTPATIENT)
Age: 68
End: 2023-09-11

## 2023-09-11 ENCOUNTER — OUTPATIENT (OUTPATIENT)
Dept: OUTPATIENT SERVICES | Facility: HOSPITAL | Age: 68
LOS: 1 days | End: 2023-09-11
Payer: MEDICARE

## 2023-09-11 DIAGNOSIS — Z00.8 ENCOUNTER FOR OTHER GENERAL EXAMINATION: ICD-10-CM

## 2023-09-11 DIAGNOSIS — Z98.890 OTHER SPECIFIED POSTPROCEDURAL STATES: Chronic | ICD-10-CM

## 2023-09-11 DIAGNOSIS — R07.9 CHEST PAIN, UNSPECIFIED: ICD-10-CM

## 2023-09-11 PROCEDURE — 75574 CT ANGIO HRT W/3D IMAGE: CPT | Mod: 26,MH

## 2023-09-11 PROCEDURE — 75574 CT ANGIO HRT W/3D IMAGE: CPT

## 2023-09-12 DIAGNOSIS — R07.9 CHEST PAIN, UNSPECIFIED: ICD-10-CM

## 2023-09-13 ENCOUNTER — OUTPATIENT (OUTPATIENT)
Dept: OUTPATIENT SERVICES | Facility: HOSPITAL | Age: 68
LOS: 1 days | End: 2023-09-13
Payer: MEDICARE

## 2023-09-13 ENCOUNTER — RESULT REVIEW (OUTPATIENT)
Age: 68
End: 2023-09-13

## 2023-09-13 DIAGNOSIS — Z98.890 OTHER SPECIFIED POSTPROCEDURAL STATES: Chronic | ICD-10-CM

## 2023-09-13 PROCEDURE — 0502T: CPT

## 2023-09-13 PROCEDURE — 0504T: CPT

## 2023-09-13 PROCEDURE — 0503T: CPT

## 2023-09-14 DIAGNOSIS — R07.9 CHEST PAIN, UNSPECIFIED: ICD-10-CM

## 2023-09-15 DIAGNOSIS — R07.9 CHEST PAIN, UNSPECIFIED: ICD-10-CM

## 2023-09-20 ENCOUNTER — OUTPATIENT (OUTPATIENT)
Dept: OUTPATIENT SERVICES | Facility: HOSPITAL | Age: 68
LOS: 1 days | End: 2023-09-20
Payer: MEDICARE

## 2023-09-20 VITALS
HEIGHT: 72 IN | WEIGHT: 214.95 LBS | DIASTOLIC BLOOD PRESSURE: 80 MMHG | TEMPERATURE: 97 F | HEART RATE: 72 BPM | RESPIRATION RATE: 16 BRPM | OXYGEN SATURATION: 99 % | SYSTOLIC BLOOD PRESSURE: 150 MMHG

## 2023-09-20 DIAGNOSIS — S83.232A COMPLEX TEAR OF MEDIAL MENISCUS, CURRENT INJURY, LEFT KNEE, INITIAL ENCOUNTER: ICD-10-CM

## 2023-09-20 DIAGNOSIS — Z98.890 OTHER SPECIFIED POSTPROCEDURAL STATES: Chronic | ICD-10-CM

## 2023-09-20 DIAGNOSIS — S83.232D COMPLEX TEAR OF MEDIAL MENISCUS, CURRENT INJURY, LEFT KNEE, SUBSEQUENT ENCOUNTER: ICD-10-CM

## 2023-09-20 DIAGNOSIS — Z01.818 ENCOUNTER FOR OTHER PREPROCEDURAL EXAMINATION: ICD-10-CM

## 2023-09-20 LAB
ALBUMIN SERPL ELPH-MCNC: 4.7 G/DL — SIGNIFICANT CHANGE UP (ref 3.5–5.2)
ALP SERPL-CCNC: 42 U/L — SIGNIFICANT CHANGE UP (ref 30–115)
ALT FLD-CCNC: 23 U/L — SIGNIFICANT CHANGE UP (ref 0–41)
ANION GAP SERPL CALC-SCNC: 13 MMOL/L — SIGNIFICANT CHANGE UP (ref 7–14)
APTT BLD: 37.2 SEC — SIGNIFICANT CHANGE UP (ref 27–39.2)
AST SERPL-CCNC: 21 U/L — SIGNIFICANT CHANGE UP (ref 0–41)
BASOPHILS # BLD AUTO: 0.07 K/UL — SIGNIFICANT CHANGE UP (ref 0–0.2)
BASOPHILS NFR BLD AUTO: 0.8 % — SIGNIFICANT CHANGE UP (ref 0–1)
BILIRUB SERPL-MCNC: 0.8 MG/DL — SIGNIFICANT CHANGE UP (ref 0.2–1.2)
BUN SERPL-MCNC: 21 MG/DL — HIGH (ref 10–20)
CALCIUM SERPL-MCNC: 9.8 MG/DL — SIGNIFICANT CHANGE UP (ref 8.4–10.5)
CHLORIDE SERPL-SCNC: 95 MMOL/L — LOW (ref 98–110)
CO2 SERPL-SCNC: 30 MMOL/L — SIGNIFICANT CHANGE UP (ref 17–32)
CREAT SERPL-MCNC: 0.8 MG/DL — SIGNIFICANT CHANGE UP (ref 0.7–1.5)
EGFR: 96 ML/MIN/1.73M2 — SIGNIFICANT CHANGE UP
EOSINOPHIL # BLD AUTO: 0.31 K/UL — SIGNIFICANT CHANGE UP (ref 0–0.7)
EOSINOPHIL NFR BLD AUTO: 3.7 % — SIGNIFICANT CHANGE UP (ref 0–8)
GLUCOSE SERPL-MCNC: 141 MG/DL — HIGH (ref 70–99)
HCT VFR BLD CALC: 43.2 % — SIGNIFICANT CHANGE UP (ref 42–52)
HGB BLD-MCNC: 15.1 G/DL — SIGNIFICANT CHANGE UP (ref 14–18)
IMM GRANULOCYTES NFR BLD AUTO: 0.4 % — HIGH (ref 0.1–0.3)
INR BLD: 1.57 RATIO — HIGH (ref 0.65–1.3)
LYMPHOCYTES # BLD AUTO: 1.41 K/UL — SIGNIFICANT CHANGE UP (ref 1.2–3.4)
LYMPHOCYTES # BLD AUTO: 17 % — LOW (ref 20.5–51.1)
MCHC RBC-ENTMCNC: 29.9 PG — SIGNIFICANT CHANGE UP (ref 27–31)
MCHC RBC-ENTMCNC: 35 G/DL — SIGNIFICANT CHANGE UP (ref 32–37)
MCV RBC AUTO: 85.5 FL — SIGNIFICANT CHANGE UP (ref 80–94)
MONOCYTES # BLD AUTO: 0.64 K/UL — HIGH (ref 0.1–0.6)
MONOCYTES NFR BLD AUTO: 7.7 % — SIGNIFICANT CHANGE UP (ref 1.7–9.3)
NEUTROPHILS # BLD AUTO: 5.82 K/UL — SIGNIFICANT CHANGE UP (ref 1.4–6.5)
NEUTROPHILS NFR BLD AUTO: 70.4 % — SIGNIFICANT CHANGE UP (ref 42.2–75.2)
NRBC # BLD: 0 /100 WBCS — SIGNIFICANT CHANGE UP (ref 0–0)
PLATELET # BLD AUTO: 355 K/UL — SIGNIFICANT CHANGE UP (ref 130–400)
PMV BLD: 9.3 FL — SIGNIFICANT CHANGE UP (ref 7.4–10.4)
POTASSIUM SERPL-MCNC: 3.7 MMOL/L — SIGNIFICANT CHANGE UP (ref 3.5–5)
POTASSIUM SERPL-SCNC: 3.7 MMOL/L — SIGNIFICANT CHANGE UP (ref 3.5–5)
PROT SERPL-MCNC: 7.3 G/DL — SIGNIFICANT CHANGE UP (ref 6–8)
PROTHROM AB SERPL-ACNC: 18.1 SEC — HIGH (ref 9.95–12.87)
RBC # BLD: 5.05 M/UL — SIGNIFICANT CHANGE UP (ref 4.7–6.1)
RBC # FLD: 13.3 % — SIGNIFICANT CHANGE UP (ref 11.5–14.5)
SODIUM SERPL-SCNC: 138 MMOL/L — SIGNIFICANT CHANGE UP (ref 135–146)
WBC # BLD: 8.28 K/UL — SIGNIFICANT CHANGE UP (ref 4.8–10.8)
WBC # FLD AUTO: 8.28 K/UL — SIGNIFICANT CHANGE UP (ref 4.8–10.8)

## 2023-09-20 PROCEDURE — 80053 COMPREHEN METABOLIC PANEL: CPT

## 2023-09-20 PROCEDURE — 85610 PROTHROMBIN TIME: CPT

## 2023-09-20 PROCEDURE — 93005 ELECTROCARDIOGRAM TRACING: CPT

## 2023-09-20 PROCEDURE — 85025 COMPLETE CBC W/AUTO DIFF WBC: CPT

## 2023-09-20 PROCEDURE — 85730 THROMBOPLASTIN TIME PARTIAL: CPT

## 2023-09-20 PROCEDURE — 97116 GAIT TRAINING THERAPY: CPT | Mod: GP

## 2023-09-20 PROCEDURE — 93010 ELECTROCARDIOGRAM REPORT: CPT

## 2023-09-20 PROCEDURE — 99214 OFFICE O/P EST MOD 30 MIN: CPT | Mod: 25

## 2023-09-20 PROCEDURE — 36415 COLL VENOUS BLD VENIPUNCTURE: CPT

## 2023-09-20 NOTE — H&P PST ADULT - HISTORY OF PRESENT ILLNESS
66 Y/O MALE PT TO PAST WITH HX LEFT KNEE PAIN , SHARP FOR PAST 3 MO   PT NOW FOR SCHEDULED PROCEDURE ( LEFT KNEE ARTHROSCOPY) . PT DENIES ANY CP SOB PALP COUGH DYSURIA FEVER URI.   Anesthesia Alert  NO--Difficult Airway  NO--History of neck surgery or radiation  NO--Limited ROM of neck  NO--History of Malignant hyperthermia  NO--Personal or family history of Pseudocholinesterase deficiency.  NO--Prior Anesthesia Complication  NO--Latex Allergy  NO--Loose teeth  NO--History of Rheumatoid Arthritis  NO--JOE  NO--Bleeding risk  NO--Other_____        RESULT SUMMARY:  0 points  Class I Risk    3.9 %  30-day risk of death, MI, or cardiac arrest          INPUTS:  Elevated-risk surgery —> 0 = No  History of ischemic heart disease —> 0 = No  History of congestive heart failure —> 0 = No  History of cerebrovascular disease —> 0 = No  Pre-operative treatment with insulin —> 0 = No  Pre-operative creatinine >2 mg/dL / 176.8 µmol/L —> 0 = No

## 2023-09-20 NOTE — H&P PST ADULT - NSICDXFAMILYHX_GEN_ALL_CORE_FT
FAMILY HISTORY:  Mother  Still living? Unknown  FH: CABG (coronary artery bypass surgery), Age at diagnosis: Age Unknown

## 2023-09-21 DIAGNOSIS — Z01.818 ENCOUNTER FOR OTHER PREPROCEDURAL EXAMINATION: ICD-10-CM

## 2023-09-21 DIAGNOSIS — S83.232A COMPLEX TEAR OF MEDIAL MENISCUS, CURRENT INJURY, LEFT KNEE, INITIAL ENCOUNTER: ICD-10-CM

## 2023-10-03 NOTE — ASU PATIENT PROFILE, ADULT - MEDICATION ADMINISTRATION INFO, PROFILE
[General Appearance - Alert] : alert [General Appearance - In No Acute Distress] : in no acute distress [Sclera] : the sclera and conjunctiva were normal [Outer Ear] : the ears and nose were normal in appearance [Neck Appearance] : the appearance of the neck was normal [] : no respiratory distress [Respiration, Rhythm And Depth] : normal respiratory rhythm and effort [Exaggerated Use Of Accessory Muscles For Inspiration] : no accessory muscle use [Apical Impulse] : the apical impulse was normal [Heart Rate And Rhythm] : heart rate was normal and rhythm regular [Heart Sounds] : normal S1 and S2 [Edema] : there was no peripheral edema [Bowel Sounds] : normal bowel sounds [Abdomen Soft] : soft [Cervical Lymph Nodes Enlarged Posterior Bilaterally] : posterior cervical [Cervical Lymph Nodes Enlarged Anterior Bilaterally] : anterior cervical [No CVA Tenderness] : no ~M costovertebral angle tenderness [Abnormal Walk] : normal gait [Musculoskeletal - Swelling] : no joint swelling seen [Skin Color & Pigmentation] : normal skin color and pigmentation [Oriented To Time, Place, And Person] : oriented to person, place, and time no concerns

## 2023-10-04 ENCOUNTER — APPOINTMENT (OUTPATIENT)
Dept: ORTHOPEDIC SURGERY | Facility: AMBULATORY SURGERY CENTER | Age: 68
End: 2023-10-04

## 2023-10-04 ENCOUNTER — OUTPATIENT (OUTPATIENT)
Dept: OUTPATIENT SERVICES | Facility: HOSPITAL | Age: 68
LOS: 1 days | Discharge: HOME | End: 2023-10-04
Payer: MEDICARE

## 2023-10-04 ENCOUNTER — TRANSCRIPTION ENCOUNTER (OUTPATIENT)
Age: 68
End: 2023-10-04

## 2023-10-04 VITALS
RESPIRATION RATE: 18 BRPM | SYSTOLIC BLOOD PRESSURE: 135 MMHG | DIASTOLIC BLOOD PRESSURE: 76 MMHG | HEART RATE: 56 BPM | TEMPERATURE: 98 F | OXYGEN SATURATION: 99 %

## 2023-10-04 VITALS
HEIGHT: 72 IN | WEIGHT: 214.95 LBS | SYSTOLIC BLOOD PRESSURE: 177 MMHG | RESPIRATION RATE: 18 BRPM | OXYGEN SATURATION: 99 % | DIASTOLIC BLOOD PRESSURE: 77 MMHG | HEART RATE: 85 BPM | TEMPERATURE: 98 F

## 2023-10-04 DIAGNOSIS — S83.249A OTHER TEAR OF MEDIAL MENISCUS, CURRENT INJURY, UNSPECIFIED KNEE, INITIAL ENCOUNTER: ICD-10-CM

## 2023-10-04 DIAGNOSIS — Z98.890 OTHER SPECIFIED POSTPROCEDURAL STATES: Chronic | ICD-10-CM

## 2023-10-04 DIAGNOSIS — S83.232A COMPLEX TEAR OF MEDIAL MENISCUS, CURRENT INJURY, LEFT KNEE, INITIAL ENCOUNTER: ICD-10-CM

## 2023-10-04 PROCEDURE — 29881 ARTHRS KNE SRG MNISECTMY M/L: CPT | Mod: LT

## 2023-10-04 PROCEDURE — 29876 ARTHRS KNEE SURG SYNVCT MAJ: CPT | Mod: LT

## 2023-10-04 RX ORDER — HYDROMORPHONE HYDROCHLORIDE 2 MG/ML
0.5 INJECTION INTRAMUSCULAR; INTRAVENOUS; SUBCUTANEOUS
Refills: 0 | Status: DISCONTINUED | OUTPATIENT
Start: 2023-10-04 | End: 2023-10-04

## 2023-10-04 RX ORDER — AMLODIPINE BESYLATE 2.5 MG/1
1 TABLET ORAL
Refills: 0 | DISCHARGE

## 2023-10-04 RX ORDER — OXYCODONE AND ACETAMINOPHEN 5; 325 MG/1; MG/1
1 TABLET ORAL
Qty: 12 | Refills: 0
Start: 2023-10-04

## 2023-10-04 RX ORDER — CHLORTHALIDONE 50 MG
1 TABLET ORAL
Refills: 0 | DISCHARGE

## 2023-10-04 NOTE — ASU DISCHARGE PLAN (ADULT/PEDIATRIC) - ASU DC SPECIAL INSTRUCTIONSFT
Post Operative Instructions for Knee Surgery    Your Surgery Included  [x ] Menisectomy  [ ] Meniscus Repair					  [x ] Synovectomy/Plica Excision	  [x ] Debridement/Chondroplasty  [ ] Lysis of Adhesions  [ ] ACL reconstruction			  [ ] PCL Reconstruction  [ ] Other:  	  Call our office ([phone]) immediately if you experience any of the following:  •	Excessive bleeding or pus like drainage at the incision site  •	Uncontrollable pain not relieved by pain medication  •	Excessive swelling or redness at the incision site  •	Fever above 101.5 degrees not controlled with Tylenol or Motrin  •	Shortness of Breath  •	Any foul odor or blistering from the surgery site    Pain Management: You were given one or more prescriptions before leaving the hospital. Have the prescriptions filled at a pharmacy on your way home and follow the instructions on the bottles.   Regional Anesthesia Injections (Blocks): You may have been given a regional nerve block either before or after surgery. This may numb your leg for 24-36 hours  	*Proceed with caution when weight bearing on your leg    Diet: Eat a bland diet for the first day after surgery. Progress your diet as tolerated. Constipation may occur with Narcotic usage, contact our office if you are experiencing constipation.    Activity: Limit your activity during the first 48 hours, keep your leg elevated with pillows under your heel. After the first 48 hours at home, increase your activity level based on your symptoms.    Dressing Change: Remove the dressing on the 3rd day. It is normal for some blood to be seen on the dressings. It is also normal for you to see apparent bruising on the skin around your knee when you remove the dressing. If present, leave the steri-strip tape across the incisions. If you are concerned by the drainage or the appearance of your knee, please call one of the numbers listed. Keep covered with Band-Aids/bandages.    Showering: You may shower on the 5th day after surgery if the wound is dry and clean, but do not let the wound soak in water until sutures are removed. Do not submerge in any water until after your postoperative appointment in clinic.    Weight Bearing:						  [x ] Weight bearing as tolerated		  [ ] Nonweight bearing				  [ ] Other:						    Operative Knee Range of Motion  [x ] Full range of motion  [ ] ROM 0-90 deg  [ ] Other:    Knee Exercises: You may do these exercises for 2-5 mins five times a day in order to help regain your range of motion.  [x ] Quad Sets: Begin activating your quadriceps muscle by driving your knee downward into full knee extension while seated on a table or bed   with a towel rolled and propped under your heel  [x ] Straight Leg Raise: While caty your quadriceps muscle, lift your fully extended leg to the level of your non-operative knee  [x ] Heel Slides: With the knee straight, slide your heel slowly toward your buttocks, hold at the endpoint for 10-15 seconds, then slowly straighten  [x ] Ankle Pumps: With your knee straight, move your ankle in a "pumping"  fashion to activate your calf and leg muscle     Follow Up: As Scheduled

## 2023-10-04 NOTE — CHART NOTE - NSCHARTNOTEFT_GEN_A_CORE
PACU ANESTHESIA ADMISSION NOTE        ____  Intubated  TV:______       Rate: ______      FiO2: ______    __x__  Patent Airway    __x__  Full return of protective reflexes    ____  Full recovery from anesthesia / back to baseline     Vitals:   T: 97          R:   14               BP:   143/74               Sat:  97%                 P:55       Mental Status:  __x__ Awake   __x___ Alert   _____ Drowsy   _____ Sedated    Nausea/Vomiting:  _x__ NO  ______Yes,   See Post - Op Orders          Pain Scale (0-10):  _0____    Treatment: ____ None    ____ See Post - Op/PCA Orders    Post - Operative Fluids:   ____ Oral   ___x_ See Post - Op Orders    Plan: Discharge:   __x__Home       _____Floor     _____Critical Care    _____  Other:_________________    Comments: patient hemodynamically stable. Handoff endorsed to PACU RN. No anesthesia related issues present. To be discharged home when criteria met

## 2023-10-04 NOTE — ASU DISCHARGE PLAN (ADULT/PEDIATRIC) - NS MD DC FALL RISK RISK
For information on Fall & Injury Prevention, visit: [email or web address] OR  [email or web address] OR  https://www.cdc.gov/steadi/patient.html

## 2023-10-10 DIAGNOSIS — M23.222 DERANGEMENT OF POSTERIOR HORN OF MEDIAL MENISCUS DUE TO OLD TEAR OR INJURY, LEFT KNEE: ICD-10-CM

## 2023-10-10 DIAGNOSIS — M22.42 CHONDROMALACIA PATELLAE, LEFT KNEE: ICD-10-CM

## 2023-10-10 DIAGNOSIS — M65.862 OTHER SYNOVITIS AND TENOSYNOVITIS, LEFT LOWER LEG: ICD-10-CM

## 2023-10-12 ENCOUNTER — APPOINTMENT (OUTPATIENT)
Dept: ORTHOPEDIC SURGERY | Facility: CLINIC | Age: 68
End: 2023-10-12
Payer: MEDICARE

## 2023-10-12 VITALS — HEIGHT: 72 IN | BODY MASS INDEX: 29.12 KG/M2 | WEIGHT: 215 LBS

## 2023-10-12 DIAGNOSIS — S89.92XA UNSPECIFIED INJURY OF LEFT LOWER LEG, INITIAL ENCOUNTER: ICD-10-CM

## 2023-10-12 PROCEDURE — 99024 POSTOP FOLLOW-UP VISIT: CPT

## 2023-10-20 ENCOUNTER — APPOINTMENT (OUTPATIENT)
Dept: PULMONOLOGY | Facility: CLINIC | Age: 68
End: 2023-10-20
Payer: MEDICARE

## 2023-10-20 VITALS
SYSTOLIC BLOOD PRESSURE: 164 MMHG | OXYGEN SATURATION: 99 % | RESPIRATION RATE: 14 BRPM | HEART RATE: 80 BPM | BODY MASS INDEX: 29.12 KG/M2 | DIASTOLIC BLOOD PRESSURE: 86 MMHG | WEIGHT: 215 LBS | HEIGHT: 72 IN

## 2023-10-20 DIAGNOSIS — E66.9 OBESITY, UNSPECIFIED: ICD-10-CM

## 2023-10-20 DIAGNOSIS — G47.33 OBSTRUCTIVE SLEEP APNEA (ADULT) (PEDIATRIC): ICD-10-CM

## 2023-10-20 PROBLEM — I48.91 UNSPECIFIED ATRIAL FIBRILLATION: Chronic | Status: ACTIVE | Noted: 2023-09-20

## 2023-10-20 PROCEDURE — 99213 OFFICE O/P EST LOW 20 MIN: CPT

## 2024-01-10 NOTE — ASU PREOP CHECKLIST - SKIN PREP
How Many Mls Were Removed From The 40 Mg/Ml (10ml) Vial When Preparing The Injectable Solution?: 0 Include Z78.9 (Other Specified Conditions Influencing Health Status) As An Associated Diagnosis?: No Kenalog Type Of Vial: Multiple Dose Consent: The risks of atrophy were reviewed with the patient. Detail Level: Detailed Expiration Date For Kenalog (Optional): 11/25 Kenalog Preparation: Kenalog Administered By (Optional): Mike Garcia MD Concentration Of Kenalog Solution Injected (Mg/Ml): 10.0 Total Volume (Ccs): 1.0 Which Kenalog Vial Was Used?: Kenalog 10 mg/ml (5 ml vial) Ndc# For Kenalog Only: [id number] Medical Necessity Clause: This procedure was medically necessary because the lesions that were treated were: Lot # For Kenalog (Optional): [id number] Validate Note Data When Using Inventory: Yes n/a

## 2024-03-22 ENCOUNTER — APPOINTMENT (OUTPATIENT)
Dept: ORTHOPEDIC SURGERY | Facility: CLINIC | Age: 69
End: 2024-03-22

## 2024-05-17 ENCOUNTER — APPOINTMENT (OUTPATIENT)
Dept: ORTHOPEDIC SURGERY | Facility: CLINIC | Age: 69
End: 2024-05-17
Payer: MEDICARE

## 2024-05-17 DIAGNOSIS — S89.92XD UNSPECIFIED INJURY OF LEFT LOWER LEG, SUBSEQUENT ENCOUNTER: ICD-10-CM

## 2024-05-17 PROCEDURE — 99213 OFFICE O/P EST LOW 20 MIN: CPT

## 2024-05-17 NOTE — DISCUSSION/SUMMARY
[de-identified] : Left knee follow-up  HPI Patient is a 68-year-old male who reports to the office for subsequent reevaluation of his left knee.  He had a left knee arthroscopy/medial meniscectomy done on 10/4/2023 by Dr. Baltazar.  His pain has improved.  Admits to aches and pains occasionally but nothing severe.  Denies any buckling, locking, instability.  Admits to mild numbness on the medial aspect of his knee occasionally as well.  Left knee exam is as follows: No effusion noted.  No erythema or ecchymosis.  Well-healed incision sites.  Able to perform active straight leg raise.  Knee flexion from 0 to 120 degrees.  Mild medial joint line tenderness to palpation.  Calf soft and nontender.  Negative Joan's.  Light touch intact throughout.  Nonantalgic gait.  Assessment/plan Patient is doing well and his pain has improved.  Explained to the patient pain status post knee arthroscopy is clinically arthritic in nature.  He will take OTC Tylenol or Motrin as needed for pain.    The knee conditioning program from the [facility] was given to the patient so they may try that at home.  May consider injections in the future if still symptomatic.  He will follow-up on as-needed basis.  All questions/concerns were answered in detail.

## 2025-01-22 ENCOUNTER — APPOINTMENT (OUTPATIENT)
Dept: PULMONOLOGY | Facility: CLINIC | Age: 70
End: 2025-01-22
Payer: MEDICARE

## 2025-01-22 VITALS
HEIGHT: 72 IN | OXYGEN SATURATION: 97 % | BODY MASS INDEX: 28.44 KG/M2 | DIASTOLIC BLOOD PRESSURE: 72 MMHG | SYSTOLIC BLOOD PRESSURE: 160 MMHG | RESPIRATION RATE: 14 BRPM | HEART RATE: 74 BPM | WEIGHT: 210 LBS

## 2025-01-22 DIAGNOSIS — G47.33 OBSTRUCTIVE SLEEP APNEA (ADULT) (PEDIATRIC): ICD-10-CM

## 2025-01-22 DIAGNOSIS — E66.811 OBESITY, CLASS 1: ICD-10-CM

## 2025-01-22 PROCEDURE — G2211 COMPLEX E/M VISIT ADD ON: CPT

## 2025-01-22 PROCEDURE — 99213 OFFICE O/P EST LOW 20 MIN: CPT
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
## 2025-05-22 NOTE — ASSESSMENT
2025      Jolynn Haji  [address] [place] [address]      Dear Colleague,    Thank you for referring your patient, Jolynn Haji, to the [facility]. Please see a copy of my visit note below.    Surgical Office Location:  [facility]  [address] [place] [id number]      Jolynn Haji is an extremely pleasant 69 year old year old female patient here today for evaluation and managment of basal cell carcinoma on nose.   .   Patient has no other skin complaints today.  Remainder of the HPI, Meds, PMH, Allergies, FH, and SH was reviewed in chart.      Past Medical History:   Diagnosis Date     Abnormal glandular Papanicolaou smear of cervix- ASCUS in 2003    [facility]-      Basal cell carcinoma of leg, right 2016    Dr Rogers     Colon polyps 2018    tubular adenoam - due 5 yrs - 2 mm     Concussion without loss of consciousness 2017     Hidradenitis     left  groin     HSIL (high grade squamous intraepithelial lesion) on Pap smear of cervix 2000    Normal paps from  to /     Hypertension goal BP (blood pressure) < 140/90      OA (osteoarthritis) of knee     moderate     Postmenopausal since 2008      Shingles 2010    left      Squamous cell carcinoma of skin, unspecified      Synovial cyst of popliteal space        Past Surgical History:   Procedure Laterality Date     COLONOSCOPY  2018    polyps, tubular - 2 mm - due 5 yrs     COLONOSCOPY N/A 2023    Procedure: Colonoscopy;  Surgeon: Yogesh Cedeño MD;  Location:  GI     [facility] NONSPECIFIC PROCEDURE      Colposcopy        Family History   Problem Relation Age of Onset     Thyroid Disease Mother         Graves disease     Hypertension Mother      Diabetes Mother         type 2     Blood Disease Mother          from leukemia at age 78     Heart Disease Father 62        MV problem with CHF  at 62/ from MI     Hypertension  [FreeTextEntry1] : Assessment:  There is a high clinical suspicion for JOE,  the patient has classic signs of obstructive sleep apnea. Obesity  Plan: I will order home sleep testing and I will see the patient  in F/U to arrange for therapies as needed. Weight loss was discussed with the patient.  The patient was counseled against driving in a sleepy state  I reviewed the entire case with the patient's wife who was in attendance today. Father      Diabetes Son         Juvenile Diabetes     Diabetes Daughter         Juvenile Diabetes     Cancer Maternal Uncle         type ??     Cancer Maternal Uncle         liver     Colon Cancer No family hx of        Social History     Socioeconomic History     Marital status:      Spouse name: Zach     Number of children: 2     Years of education: 16     Highest education level: Not on file   Occupational History     Occupation: teacher first grade -retired at age 60     Comment: [id number]     Employer: [employer]   Tobacco Use     Smoking status: Never     Passive exposure: Never     Smokeless tobacco: Never   Vaping Use     Vaping status: Never Used   Substance and Sexual Activity     Alcohol use: Yes     Alcohol/week: 0.0 - 1.0 standard drinks of alcohol     Comment: 2 per month     Drug use: No     Comment: no herbal meds either     Sexual activity: Yes     Partners: Male     Birth control/protection: Surgical     Comment: -was on DepoProvera since 2000-2/2008 -  had vasectomy   Other Topics Concern      Service No     Blood Transfusions No     Caffeine Concern No     Occupational Exposure No     Hobby Hazards No     Sleep Concern No     Stress Concern No     Weight Concern No     Special Diet No     Back Care No     Exercise Yes     Comment: regular      Bike Helmet No     Seat Belt Yes     Comment: always     Self-Exams Yes     Comment: SBE  encouraged monthly     Parent/sibling w/ CABG, MI or angioplasty before 65F 55M? No   Social History Narrative    Acsper - Urdu Cocker Spaniel - going to be 6 yrs old spring 2021---needs walks every day. --Helga Ibarra MD      January 13, 2021      Social Drivers of Health     Financial Resource Strain: Low Risk  (1/31/2025)    Financial Resource Strain      Within the past 12 months, have you or your family members you live with been unable to get utilities (heat, electricity) when it was  really needed?: No   Food Insecurity: Low Risk  (1/31/2025)    Food Insecurity      Within the past 12 months, did you worry that your food would run out before you got money to buy more?: No      Within the past 12 months, did the food you bought just not last and you didn t have money to get more?: No   Transportation Needs: Low Risk  (1/31/2025)    Transportation Needs      Within the past 12 months, has lack of transportation kept you from medical appointments, getting your medicines, non-medical meetings or appointments, work, or from getting things that you need?: No   Physical Activity: Insufficiently Active (1/31/2025)    Exercise Vital Sign      Days of Exercise per Week: 2 days      Minutes of Exercise per Session: 30 min   Stress: Stress Concern Present (1/31/2025)    [employer] [place] [employer] - Occupational Stress Questionnaire      Feeling of Stress : Very much   Social Connections: Unknown (1/31/2025)    Social Connection and Isolation Panel [NHANES]      Frequency of Communication with Friends and Family: Not on file      Frequency of Social Gatherings with Friends and Family: Twice a week      Attends Orthodoxy Services: Not on file      Active Member of Clubs or Organizations: Not on file      Attends Club or Organization Meetings: Not on file      Marital Status: Not on file   Interpersonal Safety: Low Risk  (2/6/2025)    Interpersonal Safety      Do you feel physically and emotionally safe where you currently live?: Yes      Within the past 12 months, have you been hit, slapped, kicked or otherwise physically hurt by someone?: No      Within the past 12 months, have you been humiliated or emotionally abused in other ways by your partner or ex-partner?: No   Housing Stability: Low Risk  (1/31/2025)    Housing Stability      Do you have housing? : Yes      Are you worried about losing your housing?: No       Outpatient Encounter Medications as of 5/22/2025   Medication Sig Dispense  Refill     clindamycin (CLEOCIN T) 1 % external lotion Apply topically 2 times daily. For acne flares 60 mL 3     lisinopril (ZESTRIL) 2.5 MG tablet Take 1 tablet (2.5 mg) by mouth daily as needed (BP >135/85). 90 tablet 3     propranolol ER (INDERAL LA) 60 MG 24 hr capsule TAKE ONE CAPSULE BY MOUTH ONCE DAILY 90 capsule 3     sertraline (ZOLOFT) 100 MG tablet Take 1 tablet (100 mg) by mouth daily. Take WITH 50 mg tab for total daily dose 150 mg. 90 tablet 1     sertraline (ZOLOFT) 50 MG tablet Take 1 tablet (50 mg) by mouth daily. Take WITH 100 mg tab for total daily dose 150 mg. 90 tablet 1     traZODone (DESYREL) 50 MG tablet Take 0.5-1 tablets (25-50 mg) by mouth nightly as needed for sleep. 90 tablet 1     Facility-Administered Encounter Medications as of 5/22/2025   Medication Dose Route Frequency Provider Last Rate Last Admin     lidocaine 1 % 4 mL  4 mL Other Once          lidocaine 1 % 4 mL  4 mL INTRA-ARTICULAR Once Helga Ibarra MD                 O:   NAD, WDWN, Alert & Oriented, Mood & Affect wnl, Vitals stable   General appearance normal   Vitals stable   Alert, oriented and in no acute distress     L ala 3mm scaly papule       Eyes: Conjunctivae/lids:Normal     ENT: Lips, mucosa: normal    MSK:Normal    Cardiovascular: peripheral edema none    Pulm: Breathing Normal    Neuro/Psych: Orientation:Alert and Orientedx3 ; Mood/Affect:normal       A/P:  L ala basal cell carcinoma   MOHS:   Location    The rationale for Mohs surgery was discussed with the patient and consent was obtained.  The risks and benefits as well as alternatives to therapy were discussed, in detail.  Specifically, the risks of infection, scarring, bleeding, prolonged wound healing, incomplete removal, allergy to anesthesia, nerve injury and recurrence were addressed.  Indication for Mohs was Location. Prior to the procedure, the treatment site was clearly identified and, if available, confirmed with previous photos and  confirmed by the patient   All components of the Universal Protocol/PAUSE rule were completed.  The Mohs surgeon operated in two distinct and integrated capacities as the surgeon and pathologist.      The area was prepped with Betasept.  A rim of normal appearing skin was marked circumferentially around the lesion.  The area was infiltrated with local anesthesia.  The tumor was first debulked to remove all clinically apparent tumor.  An incision following the standard Mohs approach was done and the specimen was oriented,mapped and placed in 1 block(s).  Each specimen was then chromacoded and processed in the Mohs laboratory using standard Mohs technique and submitted for frozen section histology.  Frozen section analysis showed no residual tumor but CLEAR MARGINS.      The tumor was excised using standard Mohs technique in 1 stages(s).  CLEAR MARGINS OBTAINED and Final defect size was 0.6 cm.     We discussed the options for wound management in full with the patient including risks/benefits/ possible outcomes.    DERMABRASION: After PGACAC discussed with patient, decision for tangential excision and dermabrasion was made. After anesthesia with Lido/Epi/Clinda and prep with hibiclens, hypertrophic areas were tangentially excised and entire cosmetic unit was smoothed 2.2cm. Hemostasis was obtained with pressure. Patient tolerated procedure well. There were no complications and EBL minimal. Patient advised to keep abraded surfaces covered with generous ointment until healed, approximately 2 weeks. Return to office in 3 months or prn.    It was a pleasure speaking to Jolynn Haji today.  Previous clinic notes and pertinent laboratory tests were reviewed prior to Jolynn Haji's visit.  Patient encouraged to perform monthly skin exams.  UV precautions reviewed with patient.  Return to clinic 3 months        Again, thank you for allowing me to participate in the care of your patient.        Sincerely,        Sheldon Chappell  MD Ken    Electronically signed